# Patient Record
Sex: FEMALE | Race: WHITE | NOT HISPANIC OR LATINO | Employment: FULL TIME | URBAN - METROPOLITAN AREA
[De-identification: names, ages, dates, MRNs, and addresses within clinical notes are randomized per-mention and may not be internally consistent; named-entity substitution may affect disease eponyms.]

---

## 2017-01-24 ENCOUNTER — TREATMENT (OUTPATIENT)
Dept: PHYSICAL THERAPY | Facility: CLINIC | Age: 52
End: 2017-01-24

## 2017-01-24 DIAGNOSIS — Z02.1 PRE-EMPLOYMENT HEALTH SCREENING EXAMINATION: Primary | ICD-10-CM

## 2017-01-24 PROCEDURE — PDS: Performed by: PHYSICAL THERAPIST

## 2017-02-21 ENCOUNTER — APPOINTMENT (OUTPATIENT)
Dept: WOMENS IMAGING | Facility: HOSPITAL | Age: 52
End: 2017-02-21

## 2017-02-21 PROCEDURE — 77063 BREAST TOMOSYNTHESIS BI: CPT | Performed by: RADIOLOGY

## 2017-02-21 PROCEDURE — 77067 SCR MAMMO BI INCL CAD: CPT | Performed by: RADIOLOGY

## 2017-02-21 PROCEDURE — G0202 SCR MAMMO BI INCL CAD: HCPCS | Performed by: RADIOLOGY

## 2018-02-23 ENCOUNTER — APPOINTMENT (OUTPATIENT)
Dept: WOMENS IMAGING | Facility: HOSPITAL | Age: 53
End: 2018-02-23

## 2018-02-23 PROCEDURE — 77067 SCR MAMMO BI INCL CAD: CPT | Performed by: RADIOLOGY

## 2019-02-25 ENCOUNTER — APPOINTMENT (OUTPATIENT)
Dept: WOMENS IMAGING | Facility: HOSPITAL | Age: 54
End: 2019-02-25

## 2019-02-25 PROCEDURE — 77067 SCR MAMMO BI INCL CAD: CPT | Performed by: RADIOLOGY

## 2020-03-16 ENCOUNTER — APPOINTMENT (OUTPATIENT)
Dept: WOMENS IMAGING | Facility: HOSPITAL | Age: 55
End: 2020-03-16

## 2020-03-16 PROCEDURE — 77063 BREAST TOMOSYNTHESIS BI: CPT | Performed by: RADIOLOGY

## 2020-03-16 PROCEDURE — 77067 SCR MAMMO BI INCL CAD: CPT | Performed by: RADIOLOGY

## 2021-03-17 ENCOUNTER — APPOINTMENT (OUTPATIENT)
Dept: WOMENS IMAGING | Facility: HOSPITAL | Age: 56
End: 2021-03-17

## 2021-03-17 PROCEDURE — 77063 BREAST TOMOSYNTHESIS BI: CPT | Performed by: RADIOLOGY

## 2021-03-17 PROCEDURE — 77067 SCR MAMMO BI INCL CAD: CPT | Performed by: RADIOLOGY

## 2023-03-01 ENCOUNTER — OFFICE VISIT (OUTPATIENT)
Dept: ORTHOPEDIC SURGERY | Facility: CLINIC | Age: 58
End: 2023-03-01
Payer: COMMERCIAL

## 2023-03-01 VITALS — BODY MASS INDEX: 35.44 KG/M2 | HEIGHT: 64 IN | TEMPERATURE: 97.6 F | WEIGHT: 207.6 LBS

## 2023-03-01 DIAGNOSIS — M92.60 HAGLUND'S DEFORMITY: ICD-10-CM

## 2023-03-01 DIAGNOSIS — R52 PAIN: Primary | ICD-10-CM

## 2023-03-01 DIAGNOSIS — M76.62 TENDONITIS, ACHILLES, LEFT: ICD-10-CM

## 2023-03-01 PROCEDURE — 73620 X-RAY EXAM OF FOOT: CPT | Performed by: ORTHOPAEDIC SURGERY

## 2023-03-01 PROCEDURE — 73650 X-RAY EXAM OF HEEL: CPT | Performed by: ORTHOPAEDIC SURGERY

## 2023-03-01 PROCEDURE — 99204 OFFICE O/P NEW MOD 45 MIN: CPT | Performed by: ORTHOPAEDIC SURGERY

## 2023-03-01 RX ORDER — LAMOTRIGINE 100 MG/1
TABLET ORAL
COMMUNITY
Start: 2023-02-22

## 2023-03-01 RX ORDER — AMLODIPINE BESYLATE 5 MG/1
TABLET ORAL
COMMUNITY
Start: 2023-01-05

## 2023-03-01 RX ORDER — ZOLPIDEM TARTRATE 10 MG/1
10 TABLET ORAL
COMMUNITY
Start: 2022-11-21

## 2023-03-01 RX ORDER — CHLORCYCLIZINE HYDROCHLORIDE AND PSEUDOEPHEDRINE HYDROCHLORIDE 25; 60 MG/1; MG/1
1 TABLET ORAL
COMMUNITY
Start: 2022-10-05

## 2023-03-01 RX ORDER — DICLOFENAC SODIUM 20 MG/G
2 SOLUTION TOPICAL 2 TIMES DAILY
Qty: 112 G | Refills: 11 | Status: SHIPPED | OUTPATIENT
Start: 2023-03-01

## 2023-03-01 RX ORDER — ESCITALOPRAM OXALATE 20 MG/1
TABLET ORAL
COMMUNITY
Start: 2023-02-22

## 2023-03-01 NOTE — PROGRESS NOTES
New Patient Complaint      Patient: Mindy Fletcher  YOB: 1965 57 y.o. female  Medical Record Number: [id number]    Chief Complaints: My Achilles hurts    History of Present Illness: Patient reports onset in late 2023 when she was walking down the ott and felt a sharp pulling feeling along the insertion of her Achilles.  Due to persistent pain she got a boot the first week of 2023 and pain has been improving still with some mild feelings of pulling and discomfort along the insertion of her Achilles.  She had an appointment on 2023 which she canceled and rescheduled.    She had previous mid substance right Achilles tendon rupture treated by Dr. Martinez in .    She does not report any start of pain on the left side on first up in the morning.         HPI    Allergies:   Allergies   Allergen Reactions   • Penicillins Hives     Category: Allergy;   Category: Allergy;          Medications:   Current Outpatient Medications on File Prior to Visit   Medication Sig   • amLODIPine (NORVASC) 5 MG tablet    • Chlorcyclizine-Pseudoephed (Stahist AD) 25-60 MG tablet Take 1 tablet by mouth.   • Cholecalciferol 25 MCG (1000 UT) capsule Take  by mouth Daily.   • escitalopram (LEXAPRO) 20 MG tablet    • lamoTRIgine (LaMICtal) 100 MG tablet    • zolpidem (AMBIEN) 10 MG tablet Take 1 tablet by mouth.     No current facility-administered medications on file prior to visit.       History reviewed. No pertinent past medical history.  Past Surgical History:   Procedure Laterality Date   • ESSURE TUBAL LIGATION     • FOOT SURGERY     • TONSILLECTOMY       Social History     Occupational History   • Not on file   Tobacco Use   • Smoking status: Former     Types: Cigarettes     Quit date: 2000     Years since quittin.1   • Smokeless tobacco: Not on file   Vaping Use   • Vaping Use: Never used   Substance and Sexual Activity   • Alcohol use: Yes     Comment: rarely   • Drug use: Defer   • Sexual  "activity: Defer      Social History     Social History Narrative   • Not on file     Family History   Problem Relation Age of Onset   • Cancer Mother         multiple myeloma   • Diabetes Father    • Cancer Father         cancer   • Hypertension Father    • Hypertension Sister    • Diabetes Sister        Review of Systems: 14 point review of systems performed, positive pertinent findings identified in HPI. All remaining systems negative     Review of Systems      Physical Exam:   Vitals:    03/01/23 0913   Temp: 97.6 °F (36.4 °C)   Weight: 94.2 kg (207 lb 9.6 oz)   Height: 162.6 cm (64\")   PainSc:   4     Physical Exam   Constitutional: pleasant, well developed   Eyes: sclera non icteric  Hearing : adequate for exam  Cardiovascular: palpable pulses in left foot, left calf/ thigh NT without sign of DVT  Respiratoy: breathing unlabored   Neurological: grossly sensate to LT throughout left LE  Psychiatric: oriented with normal mood and affect.   Lymphatic: No palpable popliteal lymphadenopathy left LE  Skin: intact throughout left leg/foot  Musculoskeletal: Left hindfoot was examined and there is no palpable defects of the Achilles and very mild discomfort at the insertion of the Achilles but again no palpable defects and good plantarflexion strength.  She has symmetric resting length to the contralateral side and good heel motion with calf compression.    She was nontender along the midfoot.  She had mild hallux valgus without pain.    Physical Exam  Ortho Exam    Radiology: 2 views of the left heel including lateral view of the foot and 2 views left foot ordered evaluate pain and alignment reviewed and no prior x-rays available for comparison there is mild plantar calcaneal spurring slightly prominent superior calcaneal tuberosity and very small prominence posteriorly.  There is very mild hallux valgus.  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  Mild arthritic change of the midfoot at the second more so than third TMT " joint.    Assessment/Plan: Left heel pain consistent with insertional Achilles tendinosis with Wilbur deformity but without gross calcifications    Reviewed with her I do not see any obvious sign of tear at this time or pathology that would require surgical treatment.  May have had a small interstitial tear that is now healing    We discussed getting an MRI joint and hold off on that which I think is reasonable and do not feel it would necessarily change her treatment protocol at this time.    Letter continue with the boot but placed a leather heel lift in it and have her use this for least another 2 weeks and she is start weaning out of it around the house but use leather heel lift in her shoe.    We will hold off on night splint as she is now having start of pain.  I did demonstrate heel cord stretching exercises to do at least 3 or 4 times a day and prescribed Pennsaid to apply the area several times daily.    We discussed physical therapy which she wanted to hold off on at this point.    We will see her back in about 3 weeks before she leaves for [facility] on 3/26/2023.

## 2023-03-02 ENCOUNTER — TELEPHONE (OUTPATIENT)
Dept: ORTHOPEDIC SURGERY | Facility: CLINIC | Age: 58
End: 2023-03-02
Payer: COMMERCIAL

## 2023-03-02 NOTE — TELEPHONE ENCOUNTER
Spoke with Dung at [facility] and she was informed [facility] would like for the pt to get OTC Votaren Gel instead.  Tried calling pt to inform and left a message for her to call back.

## 2023-03-02 NOTE — TELEPHONE ENCOUNTER
Faxed received from pharmacy requesting an alternative rx for Diclofenac 2% solution as insurance will not cover; please advise.